# Patient Record
Sex: FEMALE | Race: WHITE | Employment: OTHER | URBAN - METROPOLITAN AREA
[De-identification: names, ages, dates, MRNs, and addresses within clinical notes are randomized per-mention and may not be internally consistent; named-entity substitution may affect disease eponyms.]

---

## 2017-07-19 ENCOUNTER — OFFICE VISIT (OUTPATIENT)
Dept: FAMILY MEDICINE CLINIC | Facility: CLINIC | Age: 22
End: 2017-07-19

## 2017-07-19 VITALS
RESPIRATION RATE: 16 BRPM | SYSTOLIC BLOOD PRESSURE: 120 MMHG | DIASTOLIC BLOOD PRESSURE: 74 MMHG | HEART RATE: 83 BPM | HEIGHT: 63 IN | WEIGHT: 119.38 LBS | BODY MASS INDEX: 21.15 KG/M2

## 2017-07-19 DIAGNOSIS — M54.50 LOW BACK PAIN POTENTIALLY ASSOCIATED WITH RADICULOPATHY: Primary | ICD-10-CM

## 2017-07-19 PROCEDURE — 99203 OFFICE O/P NEW LOW 30 MIN: CPT | Performed by: FAMILY MEDICINE

## 2017-07-19 NOTE — PATIENT INSTRUCTIONS
Back Care Tips    Caring for your back  These are things you can do to prevent a recurrence of acute back pain and to reduce symptoms from chronic back pain:  · Maintain a healthy weight.  If you are overweight, losing weight will help most types of back · Be careful if you are given prescription pain medicines, narcotics, or medicine for muscle spasm. They can cause drowsiness, affect your coordination, reflexes, and judgment. Do not drive or operate heavy machinery while taking these types of medicines. The best way to sleep is on your side with your knees bent. Put a low pillow under your head to support your neck in a neutral spine position. Avoid thick pillows that bend your neck to one side.  Put a pillow between your legs to further relax your lower b

## 2017-07-19 NOTE — PROGRESS NOTES
Alfa Goetz is a 20year old female. HPI:     LBP:  Location  Lower back in the middle, muscles may be sore sometimes as well. Quality  Dull pain, rarely stabbing  Severity  Up to 7/10 if standing up all day at the end of the day.  Seems to hurt all nausea, vomiting, diarrhea and constipation. Genitourinary: Negative. Musculoskeletal:        As in HPI   Skin: Negative for rash. Neurological: Negative for dizziness, weakness, light-headedness, numbness and headaches.    Psychiatric/Behavioral: Ne spine.    - MRI SPINE LUMBAR (CPT=72148); Future          Imaging & Consults:  MRI SPINE LUMBAR (CPT=72148)    Return pending test results.

## 2017-07-20 ENCOUNTER — TELEPHONE (OUTPATIENT)
Dept: FAMILY MEDICINE CLINIC | Facility: CLINIC | Age: 22
End: 2017-07-20

## 2017-07-20 NOTE — TELEPHONE ENCOUNTER
Medical records release, signed by patient, successfully faxed to Dr. Margarito Patterson office requesting most recent x-ray reports and [facility] requesting most recent lab reports.

## 2017-07-21 NOTE — TELEPHONE ENCOUNTER
Lab reports received from [facility] labs placed in the folder to be signed.

## 2017-07-24 ENCOUNTER — TELEPHONE (OUTPATIENT)
Dept: FAMILY MEDICINE CLINIC | Facility: CLINIC | Age: 22
End: 2017-07-24

## 2017-07-24 NOTE — TELEPHONE ENCOUNTER
Good morning,       Rcvd denial forms for CPT code 09067, per fax rcvd request did not meet medical necessity.  Physician can call [phone] w/i 60 days from 7/23/17to request an appeal. Please advise if an appeal is completed and is approval is obtained

## 2017-07-25 NOTE — TELEPHONE ENCOUNTER
Spoke to patient and gave her the contact information to [place] [facility] site [phone] and pt verbalized understanding

## 2017-08-22 ENCOUNTER — OFFICE VISIT (OUTPATIENT)
Dept: SURGERY | Facility: CLINIC | Age: 22
End: 2017-08-22

## 2017-08-22 ENCOUNTER — TELEPHONE (OUTPATIENT)
Dept: SURGERY | Facility: CLINIC | Age: 22
End: 2017-08-22

## 2017-08-22 VITALS
HEART RATE: 66 BPM | RESPIRATION RATE: 16 BRPM | SYSTOLIC BLOOD PRESSURE: 116 MMHG | OXYGEN SATURATION: 99 % | BODY MASS INDEX: 21.26 KG/M2 | HEIGHT: 63 IN | DIASTOLIC BLOOD PRESSURE: 64 MMHG | WEIGHT: 120 LBS

## 2017-08-22 DIAGNOSIS — M54.6 CHRONIC BILATERAL THORACIC BACK PAIN: ICD-10-CM

## 2017-08-22 DIAGNOSIS — G89.29 CHRONIC BILATERAL THORACIC BACK PAIN: ICD-10-CM

## 2017-08-22 DIAGNOSIS — M54.16 LUMBAR RADICULOPATHY: ICD-10-CM

## 2017-08-22 DIAGNOSIS — M47.816 LUMBAR FACET ARTHROPATHY: Primary | ICD-10-CM

## 2017-08-22 DIAGNOSIS — M46.1 BILATERAL SACROILIITIS (HCC): ICD-10-CM

## 2017-08-22 DIAGNOSIS — M47.812 ARTHROPATHY OF CERVICAL FACET JOINT: ICD-10-CM

## 2017-08-22 PROCEDURE — 99204 OFFICE O/P NEW MOD 45 MIN: CPT | Performed by: NURSE PRACTITIONER

## 2017-08-22 RX ORDER — NAPROXEN 500 MG/1
500 TABLET ORAL EVERY 12 HOURS
Qty: 60 TABLET | Refills: 0 | Status: SHIPPED | OUTPATIENT
Start: 2017-08-22 | End: 2017-09-21

## 2017-08-22 RX ORDER — METHOCARBAMOL 500 MG/1
500 TABLET, FILM COATED ORAL EVERY 8 HOURS PRN
Qty: 60 TABLET | Refills: 0 | Status: SHIPPED | OUTPATIENT
Start: 2017-08-22 | End: 2017-09-21
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
## 2017-08-22 NOTE — PATIENT INSTRUCTIONS
Refill policies:    • Allow 2-3 business days for refills; controlled substances may take longer.   • Contact your pharmacy at least 5 days prior to running out of medication and have them send an electronic request or submit request through the [facility] have a procedure or additional testing performed. [facility]) will contact your insurance carrier to obtain pre-certification or prior authorization.     Unfortunately, [facility] has seen an increase in denial of payment even though the p medications:  • Aggrenox 10 days   • Agrylin (Anagrelide) 10 days   • Enbrel (Etanercept) 24 hours   • Fragmin (Dalteparin) 24 hours   • Pletal (Cilostazol) 7 days  • Pradaxa 10 days  • Trental 7 days  • Eliquis (Apixaban) 3 days  • Xarelto (Rivaroxaban) 3 require some medication adjustment. It is normal to have increased pain at injection site for up to 3-5 days after procedure, you can use heat for the first 24 hours (20 minutes on 20 minutes off) after the first 24 hours you can use heat or cold.        Heather Rangel out\" for the facet joint injection? No, this procedure is done with a local anesthetic. Some patients choose to receive intravenous sedation that can make the procedure easier to tolerate.  This type of sedation may cause amnesia and patients may not jacky This is because the effect of the medication injected frequently lasts for six months or more. If three injections have not helped you very much, you may be a candidate for a different procedure.  It is important to keep follow up appointment to clearly com actually injected? The injection consists of a mixture of local anesthetic and a steroid medication. Will the sacroiliac injection hurt? The sacroiliac injection involves inserting a needle through skin and deeper tissues.  The skin and deeper tissues the affected area. You should perform only those activities you can reasonably tolerate. Can I go to work to work the next day? You should be able to return to your work the next day.  The most common symptom you may feel is soreness at the injection sit appetite   • Increased heart rate   • Abdominal cramping or bloating; You should contact our office immediately if you experience any side effects These effects usually resolve within a few days.       NO SHOW POLICY FOR PAIN PROCEDURES  Procedure Cancell diagnosis, you may be given a shot of numbing medicine in the SI joint. Treatment includes rest, physical therapy, and anti-inflammatory medicines. If another health problem is the cause, then that must also be treated.  More testing may be needed if your s you relax. You will lie on an exam table on your stomach. During your treatment:  · The skin over the injection site is cleaned. A pain medicine (local anesthetic) numbs the skin.   · X-ray imaging (fluoroscopy) may be used to help the doctor see your spine include ankylosing spondylitis, rheumatoid arthritis, psoriasis, and Crohn’s disease or colitis. Symptoms may include pain or stiffness in the hips, lower back, thighs, or buttocks.  Pain occurs most often in the morning or after sitting for long periods of eyes  · Skin rash or redness  · Weakness or numbness in one or both legs  · Loss of bowel or bladder control  · Numbness in the groin area  Date Last Reviewed: 11/24/2015  © 0025-5007 The [address] [place] [address], [address] [place], [place] [address].  Al

## 2017-08-22 NOTE — PROGRESS NOTES
Spoke with patient and scheduled injections. Reviewed pre-op instructions. Patient verbalized understanding, no further questions at this time.  Pre-op instructions sent via [employer] and [employer].

## 2017-08-22 NOTE — PROGRESS NOTES
HPI:    Patient ID: Stef Aguilera is a 20year old female. HPI    Review of Systems         No current outpatient prescriptions on file.   Allergies:No Known Allergies   PHYSICAL EXAM:   Physical Exam   Constitutional:                  ASSESSMENT/PLAN

## 2017-08-29 ENCOUNTER — TELEPHONE (OUTPATIENT)
Dept: SURGERY | Facility: CLINIC | Age: 22
End: 2017-08-29

## 2017-08-29 DIAGNOSIS — M47.816 LUMBAR FACET ARTHROPATHY: Primary | ICD-10-CM

## 2017-08-29 NOTE — TELEPHONE ENCOUNTER
Previous order did not specify laterality, please use this case request to replace the previous one that had no laterality.    Thanks

## 2017-09-01 ENCOUNTER — OFFICE VISIT (OUTPATIENT)
Dept: PHYSICAL THERAPY | Age: 22
End: 2017-09-01
Attending: NURSE PRACTITIONER
Payer: MEDICAID

## 2017-09-01 DIAGNOSIS — M54.6 CHRONIC BILATERAL THORACIC BACK PAIN: ICD-10-CM

## 2017-09-01 DIAGNOSIS — M46.1 BILATERAL SACROILIITIS (HCC): ICD-10-CM

## 2017-09-01 DIAGNOSIS — G89.29 CHRONIC BILATERAL THORACIC BACK PAIN: ICD-10-CM

## 2017-09-01 DIAGNOSIS — M54.16 LUMBAR RADICULOPATHY: ICD-10-CM

## 2017-09-01 DIAGNOSIS — M47.812 ARTHROPATHY OF CERVICAL FACET JOINT: ICD-10-CM

## 2017-09-01 DIAGNOSIS — M47.816 LUMBAR FACET ARTHROPATHY: ICD-10-CM

## 2017-09-01 PROCEDURE — 97530 THERAPEUTIC ACTIVITIES: CPT

## 2017-09-01 PROCEDURE — 97162 PT EVAL MOD COMPLEX 30 MIN: CPT

## 2017-09-01 NOTE — PROGRESS NOTES
SPINE EVALUATION:   Referring Physician: Dr. 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        Date of Service: 9/1/2017     PATIENT Trudi Cardenas is a 20year old y/o female who presents to therapy today with co from advanced pain science education with progression through graded exposure to decrease the sensitization that she is experiencing with otherwise ordinary tasks that she is accustomed to.   Her cervical and lumbar ROM is normal.  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 PT described graded exposure principles to include performing an activity to (and not beyond) the point of pain.   She is to modify this activity in duration or technique in order to complete care.    X___________________________________________________ Date____________________    Certification From: 2/5/3887  To:11/30/2017

## 2017-09-06 ENCOUNTER — TELEPHONE (OUTPATIENT)
Dept: NEUROLOGY | Facility: CLINIC | Age: 22
End: 2017-09-06

## 2017-09-11 ENCOUNTER — OFFICE VISIT (OUTPATIENT)
Dept: PHYSICAL THERAPY | Age: 22
End: 2017-09-11
Attending: NURSE PRACTITIONER
Payer: MEDICAID

## 2017-09-11 DIAGNOSIS — M46.1 BILATERAL SACROILIITIS (HCC): ICD-10-CM

## 2017-09-11 DIAGNOSIS — G89.29 CHRONIC BILATERAL THORACIC BACK PAIN: ICD-10-CM

## 2017-09-11 DIAGNOSIS — M54.6 CHRONIC BILATERAL THORACIC BACK PAIN: ICD-10-CM

## 2017-09-11 DIAGNOSIS — M54.16 LUMBAR RADICULOPATHY: ICD-10-CM

## 2017-09-11 DIAGNOSIS — M47.812 ARTHROPATHY OF CERVICAL FACET JOINT: ICD-10-CM

## 2017-09-11 DIAGNOSIS — M47.816 LUMBAR FACET ARTHROPATHY: ICD-10-CM

## 2017-09-11 PROCEDURE — 97140 MANUAL THERAPY 1/> REGIONS: CPT

## 2017-09-11 PROCEDURE — 97110 THERAPEUTIC EXERCISES: CPT

## 2017-09-11 NOTE — PROGRESS NOTES
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 including high level exercise and running. 3. Patient will demonstrate understanding of central sensitization and be able to modify and progress her exercise level according to graded exposure principles.       Plan: Assess response and progress as hadley

## 2017-09-15 ENCOUNTER — OFFICE VISIT (OUTPATIENT)
Dept: PHYSICAL THERAPY | Age: 22
End: 2017-09-15
Attending: NURSE PRACTITIONER
Payer: MEDICAID

## 2017-09-15 DIAGNOSIS — G89.29 CHRONIC BILATERAL THORACIC BACK PAIN: ICD-10-CM

## 2017-09-15 DIAGNOSIS — M47.812 ARTHROPATHY OF CERVICAL FACET JOINT: ICD-10-CM

## 2017-09-15 DIAGNOSIS — M46.1 BILATERAL SACROILIITIS (HCC): ICD-10-CM

## 2017-09-15 DIAGNOSIS — M47.816 LUMBAR FACET ARTHROPATHY: ICD-10-CM

## 2017-09-15 DIAGNOSIS — M54.6 CHRONIC BILATERAL THORACIC BACK PAIN: ICD-10-CM

## 2017-09-15 DIAGNOSIS — M54.16 LUMBAR RADICULOPATHY: ICD-10-CM

## 2017-09-15 PROCEDURE — 97140 MANUAL THERAPY 1/> REGIONS: CPT

## 2017-09-15 PROCEDURE — 97110 THERAPEUTIC EXERCISES: CPT

## 2017-09-15 NOTE — PROGRESS NOTES
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 Patient is very non-descript in her experiences with exercises. There is a vagueness to the answers of her questions. Goals:   1. Increase FOTO >11% from INE.     2. Patient will demonstrate a return to activity at

## 2017-09-18 ENCOUNTER — OFFICE VISIT (OUTPATIENT)
Dept: PHYSICAL THERAPY | Age: 22
End: 2017-09-18
Attending: NURSE PRACTITIONER
Payer: MEDICAID

## 2017-09-18 ENCOUNTER — OFFICE VISIT (OUTPATIENT)
Dept: FAMILY MEDICINE CLINIC | Facility: CLINIC | Age: 22
End: 2017-09-18

## 2017-09-18 VITALS
BODY MASS INDEX: 20.02 KG/M2 | TEMPERATURE: 98 F | HEART RATE: 66 BPM | SYSTOLIC BLOOD PRESSURE: 108 MMHG | WEIGHT: 113 LBS | HEIGHT: 63 IN | DIASTOLIC BLOOD PRESSURE: 62 MMHG

## 2017-09-18 DIAGNOSIS — J01.10 ACUTE FRONTAL SINUSITIS, RECURRENCE NOT SPECIFIED: Primary | ICD-10-CM

## 2017-09-18 DIAGNOSIS — M54.16 LUMBAR RADICULOPATHY: ICD-10-CM

## 2017-09-18 DIAGNOSIS — M54.6 CHRONIC BILATERAL THORACIC BACK PAIN: ICD-10-CM

## 2017-09-18 DIAGNOSIS — M47.812 ARTHROPATHY OF CERVICAL FACET JOINT: ICD-10-CM

## 2017-09-18 DIAGNOSIS — M46.1 BILATERAL SACROILIITIS (HCC): ICD-10-CM

## 2017-09-18 DIAGNOSIS — M47.816 LUMBAR FACET ARTHROPATHY: ICD-10-CM

## 2017-09-18 DIAGNOSIS — R59.1 LYMPHADENOPATHY: ICD-10-CM

## 2017-09-18 DIAGNOSIS — G89.29 CHRONIC BILATERAL THORACIC BACK PAIN: ICD-10-CM

## 2017-09-18 PROCEDURE — 99214 OFFICE O/P EST MOD 30 MIN: CPT | Performed by: FAMILY MEDICINE

## 2017-09-18 PROCEDURE — 97110 THERAPEUTIC EXERCISES: CPT

## 2017-09-18 PROCEDURE — 97140 MANUAL THERAPY 1/> REGIONS: CPT

## 2017-09-18 RX ORDER — NABUMETONE 500 MG/1
500 TABLET, FILM COATED ORAL 2 TIMES DAILY PRN
Qty: 60 TABLET | Refills: 0 | Status: SHIPPED | OUTPATIENT
Start: 2017-09-18 | End: 2018-01-29 | Stop reason: ALTCHOICE

## 2017-09-18 RX ORDER — FLUTICASONE PROPIONATE 50 MCG
2 SPRAY, SUSPENSION (ML) NASAL DAILY
Qty: 1 BOTTLE | Refills: 2 | Status: SHIPPED | OUTPATIENT
Start: 2017-09-18 | End: 2017-10-03 | Stop reason: ALTCHOICE

## 2017-09-18 NOTE — PATIENT INSTRUCTIONS
-- start zyrtec daily  -- start flonase nasal spray daily  -- use ear drops to help loosen wax  --ibuprofen 600mg-800mg up to 3x/day with meals as needed for pain (do not use for prolonged period) - use at least 400-600mg 3x/day with meals for next 2-3 d

## 2017-09-18 NOTE — PROGRESS NOTES
CC:  Leny Singh is a 20year old female here for Patient presents with:  Sore Throat: Swelling in lymph nodes   Ear Pain: Left ear pain is worse       HPI:     URI  -mom noted swollen lymph nodes a couple of weeks ago  -2-3 days ago, started to get s Breastfeeding?  No   BMI 20.02 kg/m²     GENERAL: A&O well developed, well nourished,in no apparent distress  HEENT: atraumatic, pupils round and reactive to light, MMM, pharyngeal erythema without exudates or edema, TMs occluded due to cerumen impaction; s

## 2017-09-18 NOTE — PROGRESS NOTES
Dx: Spinal Central Sensitization         Authorized # of Visits:  6         Next MD visit: none scheduled  Fall Risk: standard         Precautions: n/a             Subjective: Feeling better today than last session.       Objective: Treatment progressed per the hips and pelvis Thoracic PA manipulation with cavitation T1T2 to T7T8       Skilled Services: All Exercises Supervised and Progressed by skilled PT. Assessment: Responded fair. Less pain responses with treatment today.   Increased tolerance for

## 2017-09-22 ENCOUNTER — OFFICE VISIT (OUTPATIENT)
Dept: PHYSICAL THERAPY | Age: 22
End: 2017-09-22
Attending: NURSE PRACTITIONER
Payer: MEDICAID

## 2017-09-22 DIAGNOSIS — M46.1 BILATERAL SACROILIITIS (HCC): ICD-10-CM

## 2017-09-22 DIAGNOSIS — M47.812 ARTHROPATHY OF CERVICAL FACET JOINT: ICD-10-CM

## 2017-09-22 DIAGNOSIS — M47.816 LUMBAR FACET ARTHROPATHY: ICD-10-CM

## 2017-09-22 DIAGNOSIS — G89.29 CHRONIC BILATERAL THORACIC BACK PAIN: ICD-10-CM

## 2017-09-22 DIAGNOSIS — M54.6 CHRONIC BILATERAL THORACIC BACK PAIN: ICD-10-CM

## 2017-09-22 DIAGNOSIS — M54.16 LUMBAR RADICULOPATHY: ICD-10-CM

## 2017-09-22 PROCEDURE — 97110 THERAPEUTIC EXERCISES: CPT

## 2017-09-22 PROCEDURE — 97140 MANUAL THERAPY 1/> REGIONS: CPT

## 2017-09-22 NOTE — PROGRESS NOTES
Dx: Spinal Central Sensitization         Authorized # of Visits:  6         Next MD visit: none scheduled  Fall Risk: standard         Precautions: n/a             Subjective: Patient presents with vague symptomatology complaining of random \"tingles\" and hamstring stretch 10 sec x 10 GR hamstring stretch 10 sec x 10 HOLD HOLD      GR hip flexor stretch 30 sec x 3 GR hip flexor stretch 30 sec x 3 HOLD HOLD       SL Clamshells glut med/min bias red x 20 HOLD SL Clamshells glut med/min bias red x 20       Ant

## 2017-09-29 ENCOUNTER — TELEPHONE (OUTPATIENT)
Dept: NEUROLOGY | Facility: CLINIC | Age: 22
End: 2017-09-29

## 2017-09-30 ENCOUNTER — HOSPITAL (OUTPATIENT)
Dept: OTHER | Age: 22
End: 2017-09-30
Attending: EMERGENCY MEDICINE

## 2017-09-30 LAB
ANALYZER ANC (IANC): ABNORMAL
ANION GAP SERPL CALC-SCNC: 10 MMOL/L (ref 10–20)
BASOPHILS # BLD: 0 THOUSAND/MCL (ref 0–0.3)
BASOPHILS NFR BLD: 0 %
BUN SERPL-MCNC: 12 MG/DL (ref 6–20)
BUN/CREAT SERPL: 14 (ref 7–25)
CALCIUM SERPL-MCNC: 8.9 MG/DL (ref 8.4–10.2)
CHLORIDE: 105 MMOL/L (ref 98–107)
CO2 SERPL-SCNC: 25 MMOL/L (ref 21–32)
CREAT SERPL-MCNC: 0.87 MG/DL (ref 0.51–0.95)
DIFFERENTIAL METHOD BLD: ABNORMAL
EOSINOPHIL # BLD: 0.2 THOUSAND/MCL (ref 0.1–0.5)
EOSINOPHIL NFR BLD: 2 %
ERYTHROCYTE [DISTWIDTH] IN BLOOD: 12.5 % (ref 11–15)
GLUCOSE SERPL-MCNC: 97 MG/DL (ref 65–99)
HCG POINT OF CARE (5HGRST): NEGATIVE
HEMATOCRIT: 35.1 % (ref 36–46.5)
HGB BLD-MCNC: 11.6 GM/DL (ref 12–15.5)
LYMPHOCYTES # BLD: 2.6 THOUSAND/MCL (ref 1–4.8)
LYMPHOCYTES NFR BLD: 26 %
MAGNESIUM SERPL-MCNC: 2 MG/DL (ref 1.7–2.4)
MCH RBC QN AUTO: 26.7 PG (ref 26–34)
MCHC RBC AUTO-ENTMCNC: 33 GM/DL (ref 32–36.5)
MCV RBC AUTO: 80.7 FL (ref 78–100)
MONOCYTES # BLD: 0.3 THOUSAND/MCL (ref 0.3–0.9)
MONOCYTES NFR BLD: 3 %
NEUTROPHILS # BLD: 6.7 THOUSAND/MCL (ref 1.8–7.7)
NEUTROPHILS NFR BLD: 69 %
NEUTS SEG NFR BLD: ABNORMAL %
PERCENT NRBC: ABNORMAL
PLATELET # BLD: 290 THOUSAND/MCL (ref 140–450)
POTASSIUM SERPL-SCNC: 3.5 MMOL/L (ref 3.4–5.1)
RBC # BLD: 4.35 MILLION/MCL (ref 4–5.2)
SODIUM SERPL-SCNC: 136 MMOL/L (ref 135–145)
WBC # BLD: 9.8 THOUSAND/MCL (ref 4.2–11)

## 2017-10-02 ENCOUNTER — TELEPHONE (OUTPATIENT)
Dept: SURGERY | Facility: CLINIC | Age: 22
End: 2017-10-02

## 2017-10-02 NOTE — TELEPHONE ENCOUNTER
Per pt request, pt cancelled 10/3 & 10/10 injections. Pt states she will see neurologist first for additional feedback. Cases cancelled in OR. No further needs.

## 2017-10-03 ENCOUNTER — OFFICE VISIT (OUTPATIENT)
Dept: FAMILY MEDICINE CLINIC | Facility: CLINIC | Age: 22
End: 2017-10-03

## 2017-10-03 VITALS
DIASTOLIC BLOOD PRESSURE: 60 MMHG | HEIGHT: 63 IN | HEART RATE: 66 BPM | WEIGHT: 116 LBS | SYSTOLIC BLOOD PRESSURE: 110 MMHG | BODY MASS INDEX: 20.55 KG/M2

## 2017-10-03 DIAGNOSIS — M54.50 LOW BACK PAIN POTENTIALLY ASSOCIATED WITH RADICULOPATHY: ICD-10-CM

## 2017-10-03 DIAGNOSIS — R42 LIGHTHEADED: ICD-10-CM

## 2017-10-03 DIAGNOSIS — R20.2 PARESTHESIA OF BILATERAL LEGS: Primary | ICD-10-CM

## 2017-10-03 PROCEDURE — 99215 OFFICE O/P EST HI 40 MIN: CPT | Performed by: FAMILY MEDICINE

## 2017-10-03 RX ORDER — AMITRIPTYLINE HYDROCHLORIDE 10 MG/1
10 TABLET, FILM COATED ORAL NIGHTLY
Qty: 30 TABLET | Refills: 0 | Status: SHIPPED | OUTPATIENT
Start: 2017-10-03 | End: 2017-12-15 | Stop reason: ALTCHOICE

## 2017-10-03 NOTE — PROGRESS NOTES
Dayami Tate is a 20year old female here for Patient presents with:  ER F/U: Patient went to [facility] on 09/30/2017 low back and neck pain       HPI:     Low back pain  -has had history of this off and on for several years  -was being seen by angela Denies  --RESP: Denies  --CV: Denies  --GI: Denies  --: Denies  --MSK: Denies  --NEURO: see hpi  --PSYCH: Denies  --SKIN: Denies  All other systems reviewed are negative    PHYSICAL EXAM:   /60 (BP Location: Right arm, Patient Position: Sitting, Cu spent a total of 40 minutes, half of which was spent counseling/coordinating care regarding paresthesias and back pain

## 2017-10-03 NOTE — PATIENT INSTRUCTIONS
-- -- come in for fasting bloodwork anytime that you are able to (8-10h no food, only water; lab here is open M-F, 8am-12)  -- go to [employer] to schedule an appointment online  -- we will call with results about 5-7 days after bloodwork is complete

## 2017-10-09 ENCOUNTER — TELEPHONE (OUTPATIENT)
Dept: NEUROLOGY | Facility: CLINIC | Age: 22
End: 2017-10-09

## 2017-10-10 ENCOUNTER — LAB ENCOUNTER (OUTPATIENT)
Dept: LAB | Age: 22
End: 2017-10-10
Attending: FAMILY MEDICINE
Payer: MEDICAID

## 2017-10-10 DIAGNOSIS — R20.2 PARESTHESIA OF BILATERAL LEGS: ICD-10-CM

## 2017-10-10 DIAGNOSIS — R42 LIGHTHEADED: ICD-10-CM

## 2017-10-10 PROCEDURE — 84550 ASSAY OF BLOOD/URIC ACID: CPT | Performed by: FAMILY MEDICINE

## 2017-10-10 PROCEDURE — 82607 VITAMIN B-12: CPT | Performed by: FAMILY MEDICINE

## 2017-10-10 PROCEDURE — 86200 CCP ANTIBODY: CPT | Performed by: FAMILY MEDICINE

## 2017-10-10 PROCEDURE — 80050 GENERAL HEALTH PANEL: CPT | Performed by: FAMILY MEDICINE

## 2017-10-10 PROCEDURE — 82306 VITAMIN D 25 HYDROXY: CPT | Performed by: FAMILY MEDICINE

## 2017-10-10 PROCEDURE — 86038 ANTINUCLEAR ANTIBODIES: CPT | Performed by: FAMILY MEDICINE

## 2017-10-10 PROCEDURE — 86431 RHEUMATOID FACTOR QUANT: CPT | Performed by: FAMILY MEDICINE

## 2017-10-10 PROCEDURE — 82746 ASSAY OF FOLIC ACID SERUM: CPT | Performed by: FAMILY MEDICINE

## 2017-10-10 PROCEDURE — 36415 COLL VENOUS BLD VENIPUNCTURE: CPT | Performed by: FAMILY MEDICINE

## 2017-10-10 PROCEDURE — 85652 RBC SED RATE AUTOMATED: CPT | Performed by: FAMILY MEDICINE

## 2017-10-10 PROCEDURE — 86140 C-REACTIVE PROTEIN: CPT | Performed by: FAMILY MEDICINE

## 2017-10-13 ENCOUNTER — TELEPHONE (OUTPATIENT)
Dept: SURGERY | Facility: CLINIC | Age: 22
End: 2017-10-13

## 2017-10-13 ENCOUNTER — TELEPHONE (OUTPATIENT)
Dept: FAMILY MEDICINE CLINIC | Facility: CLINIC | Age: 22
End: 2017-10-13

## 2017-10-13 DIAGNOSIS — E55.9 VITAMIN D DEFICIENCY: Primary | ICD-10-CM

## 2017-10-13 RX ORDER — ERGOCALCIFEROL 1.25 MG/1
50000 CAPSULE ORAL WEEKLY
Qty: 12 CAPSULE | Refills: 0 | Status: SHIPPED | OUTPATIENT
Start: 2017-10-13 | End: 2017-12-15 | Stop reason: ALTCHOICE

## 2017-10-13 NOTE — TELEPHONE ENCOUNTER
----- Message from Lynne Pan MD sent at 10/13/2017  9:25 AM CDT -----  Inflammatory labs negative  Vit D low - Vit D 50,000 units weekly x 12 wks, then recheck Vit D upon completion  Mild anemia - try to eat more iron in diet, or consider otc iron on

## 2017-10-13 NOTE — TELEPHONE ENCOUNTER
I left a detailed message on the patient's confidential voice mail stating that per Dr. David Madrigal, her inflammatory labs are negative. Per Dr. David Madrigal, I also stated that her vitamin D is slow and she should start Vitamin D 50,000 units weekly for 12 weeks.  I st

## 2017-10-13 NOTE — TELEPHONE ENCOUNTER
----- Message from Ann Jaime MD sent at 10/13/2017  9:25 AM CDT -----  Inflammatory labs negative  Vit D low - Vit D 50,000 units weekly x 12 wks, then recheck Vit D upon completion  Mild anemia - try to eat more iron in diet, or consider otc iron on

## 2017-10-13 NOTE — TELEPHONE ENCOUNTER
Pt requested her procedure on 10/17 to be cancelled. Case removed from OR. Pt instructed to call and reschedule when she is ready.

## 2017-10-13 NOTE — PROGRESS NOTES
Inflammatory labs negative  Vit D low - Vit D 50,000 units weekly x 12 wks, then recheck Vit D upon completion  Mild anemia - try to eat more iron in diet, or consider otc iron once daily  Followup as planned

## 2017-10-24 ENCOUNTER — OFFICE VISIT (OUTPATIENT)
Dept: FAMILY MEDICINE CLINIC | Facility: CLINIC | Age: 22
End: 2017-10-24

## 2017-10-24 VITALS
SYSTOLIC BLOOD PRESSURE: 112 MMHG | BODY MASS INDEX: 21.09 KG/M2 | HEART RATE: 60 BPM | WEIGHT: 119 LBS | DIASTOLIC BLOOD PRESSURE: 64 MMHG | HEIGHT: 63 IN

## 2017-10-24 DIAGNOSIS — R20.2 PARESTHESIA OF BILATERAL LEGS: ICD-10-CM

## 2017-10-24 DIAGNOSIS — M54.50 LOW BACK PAIN POTENTIALLY ASSOCIATED WITH RADICULOPATHY: Primary | ICD-10-CM

## 2017-10-24 PROCEDURE — 99214 OFFICE O/P EST MOD 30 MIN: CPT | Performed by: FAMILY MEDICINE

## 2017-10-24 NOTE — PATIENT INSTRUCTIONS
--decrease amitriptyline to 1/2 tab daily for 1 wk to see if that helps fatigue in AM - then increase back to a full tab nightly  --consider chiropracter  -try [facility] on [address] ([address]) in [place] - [phone]

## 2017-10-24 NOTE — PROGRESS NOTES
Chi Cole is a 20year old female here for Patient presents with: Follow - Up: The patient states that her back pain has not improved.        HPI:     Low back pain/paresthesias  -comes and goes  -today, pain is a little worse - but also has been wo LMP 10/12/2017 (Exact Date)   Breastfeeding?  No   BMI 21.08 kg/m²     Gen: NAD, alert and oriented x 3  HEENT: NCAT, pupils equal and round  Pulm: CTAB, no wheezing  CV: RRR, no murmurs  Ext: full ROM  Psych: normal affect  Neuro: b/l LE with 5/5 strength,

## 2017-11-08 ENCOUNTER — OFFICE VISIT (OUTPATIENT)
Dept: NEUROLOGY | Facility: CLINIC | Age: 22
End: 2017-11-08

## 2017-11-08 VITALS
DIASTOLIC BLOOD PRESSURE: 68 MMHG | SYSTOLIC BLOOD PRESSURE: 90 MMHG | HEART RATE: 80 BPM | BODY MASS INDEX: 21 KG/M2 | WEIGHT: 119 LBS | RESPIRATION RATE: 16 BRPM

## 2017-11-08 DIAGNOSIS — M47.816 LUMBAR FACET ARTHROPATHY: ICD-10-CM

## 2017-11-08 DIAGNOSIS — R20.2 PARESTHESIA OF BOTH LOWER EXTREMITIES: Primary | ICD-10-CM

## 2017-11-08 PROCEDURE — 99244 OFF/OP CNSLTJ NEW/EST MOD 40: CPT | Performed by: OTHER

## 2017-11-08 NOTE — PATIENT INSTRUCTIONS
After your visit at the [facility] office  today,  please direct any follow up questions or medication needs to the staff in our  [place] office so that your concerns may be promptly addressed.   We are available through [employer] or at the numbers below: office has notified you that the test has been approved by your insurer. Depending on your insurance carrier, approval may take 3-10 days. It is highly recommended patients contact their insurance carrier directly to determine coverage.   If test is done w you are on any medications to thin your blood.

## 2017-11-08 NOTE — PROGRESS NOTES
[facility] [address]   Neurology- INITIAL CLINIC VISIT  2017, 8:87 PM     Willa Ferrari Patient Status:  No patient class for patient encounter    1995 MRN [id number]   [facility] x10(3) uL 2.82   Monocytes Absolute      0.10 - 0.60 x10(3) uL 0.45   Eosinophils Absolute      0.00 - 0.30 x10(3) uL 0.19   Basophils Absolute      0.00 - 0.10 x10(3) uL 0.04   Immature Granulocyte Absolute      0.00 - 1.00 x10(3) uL 0.02   Neutrophils % lesions. Musculoskeletal: There is no scoliosis, or joint deformities  Neurologic examination:  Mental status: Patient is alert, attentive, and oriented x 3. Language is coherent and fluent without aphasia.  Memory, comprehension and ability to follow comm reviewed for nutritional and metabolic causes. Would not check additional blood work at this time, unless above testing is negative.     No prescriptions requested or ordered in this encounter       We discussed in depth regarding the diagnosis, prognosis,

## 2017-11-13 ENCOUNTER — TELEPHONE (OUTPATIENT)
Dept: SURGERY | Facility: CLINIC | Age: 22
End: 2017-11-13

## 2017-11-13 DIAGNOSIS — R20.2 PARESTHESIA OF BOTH LOWER EXTREMITIES: ICD-10-CM

## 2017-11-13 DIAGNOSIS — M47.816 LUMBAR FACET ARTHROPATHY: Primary | ICD-10-CM

## 2017-11-13 NOTE — TELEPHONE ENCOUNTER
In order to complete a medical necessity review on you request, the following information is required: you have requested a MRI Thoracic spine without contrast 67722. The information provided meets clinical guidelines for a MRI Thoracic spine with and without contrast 79811. Please discuss with provider as if the code can be changed so that the medical necessity review process can be completed.

## 2017-11-14 NOTE — TELEPHONE ENCOUNTER
Please place new order for MRI Spine Thoracic w/wo contrast cpt code 36610.  Per Domo REHMAN was approved

## 2017-11-14 NOTE — TELEPHONE ENCOUNTER
Per Cee from [place] team, both MRIs should be ordered w+w/o contrast in order to be approved. New orders placed as written orders and orders for w/o only discontinued.

## 2017-11-20 ENCOUNTER — HOSPITAL ENCOUNTER (OUTPATIENT)
Dept: MRI IMAGING | Age: 22
Discharge: HOME | End: 2017-11-20
Attending: Other
Payer: MEDICAID

## 2017-11-20 DIAGNOSIS — M47.816 LUMBAR FACET ARTHROPATHY: ICD-10-CM

## 2017-11-20 DIAGNOSIS — R20.2 PARESTHESIA OF BOTH LOWER EXTREMITIES: ICD-10-CM

## 2017-11-20 PROCEDURE — 70553 MRI BRAIN STEM W/O & W/DYE: CPT | Performed by: OTHER

## 2017-11-20 PROCEDURE — 72157 MRI CHEST SPINE W/O & W/DYE: CPT | Performed by: OTHER

## 2017-11-20 PROCEDURE — A9575 INJ GADOTERATE MEGLUMI 0.1ML: HCPCS | Performed by: OTHER

## 2017-12-15 ENCOUNTER — OFFICE VISIT (OUTPATIENT)
Dept: FAMILY MEDICINE CLINIC | Facility: CLINIC | Age: 22
End: 2017-12-15

## 2017-12-15 VITALS
HEART RATE: 80 BPM | BODY MASS INDEX: 21.26 KG/M2 | SYSTOLIC BLOOD PRESSURE: 112 MMHG | HEIGHT: 63 IN | DIASTOLIC BLOOD PRESSURE: 60 MMHG | WEIGHT: 120 LBS

## 2017-12-15 DIAGNOSIS — M54.50 LOW BACK PAIN POTENTIALLY ASSOCIATED WITH RADICULOPATHY: ICD-10-CM

## 2017-12-15 DIAGNOSIS — Z00.00 ROUTINE GENERAL MEDICAL EXAMINATION AT A HEALTH CARE FACILITY: Primary | ICD-10-CM

## 2017-12-15 DIAGNOSIS — B07.9 WART OF FACE: ICD-10-CM

## 2017-12-15 PROCEDURE — 99395 PREV VISIT EST AGE 18-39: CPT | Performed by: FAMILY MEDICINE

## 2017-12-15 PROCEDURE — 99213 OFFICE O/P EST LOW 20 MIN: CPT | Performed by: FAMILY MEDICINE

## 2017-12-15 NOTE — PROGRESS NOTES
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 No past medical history on file. No past surgical history on file.    Social History:    Smoking status: Never Smoker                                                              Smokeless tobac

## 2017-12-15 NOTE — PATIENT INSTRUCTIONS
-- start otc compound W (liquid form if available) to see if helps wart  -- if not, come back and we can try to freeze it   -- followup with neuro as planned for emg

## 2017-12-19 ENCOUNTER — OFFICE VISIT (OUTPATIENT)
Dept: ELECTROPHYSIOLOGY | Facility: HOSPITAL | Age: 22
End: 2017-12-19
Attending: Other
Payer: MEDICAID

## 2017-12-19 ENCOUNTER — NURSE ONLY (OUTPATIENT)
Dept: FAMILY MEDICINE CLINIC | Facility: CLINIC | Age: 22
End: 2017-12-19

## 2017-12-19 DIAGNOSIS — M47.816 LUMBAR FACET ARTHROPATHY: ICD-10-CM

## 2017-12-19 DIAGNOSIS — Z11.1 PPD SCREENING TEST: Primary | ICD-10-CM

## 2017-12-19 DIAGNOSIS — R20.2 PARESTHESIA OF BOTH LOWER EXTREMITIES: ICD-10-CM

## 2017-12-19 PROCEDURE — 86580 TB INTRADERMAL TEST: CPT | Performed by: FAMILY MEDICINE

## 2017-12-19 PROCEDURE — 95886 MUSC TEST DONE W/N TEST COMP: CPT | Performed by: OTHER

## 2017-12-19 PROCEDURE — 95909 NRV CNDJ TST 5-6 STUDIES: CPT | Performed by: OTHER

## 2017-12-19 NOTE — PROCEDURES
Nerve Conduction/Electromyography Report      [facility]   EMG department  [place] [address] [address] [address]  [address] [place] [address]  [phone]      Patient name: Chun Melendez  YOB: 1995  Referring physician: Dr. Cuca Schwartz  Reason for study: Arnaldo Parikh

## 2017-12-22 ENCOUNTER — NURSE ONLY (OUTPATIENT)
Dept: FAMILY MEDICINE CLINIC | Facility: CLINIC | Age: 22
End: 2017-12-22

## 2017-12-22 NOTE — PROGRESS NOTES
Completed paperwork mailed to Nissa Mota [id number]    Copy of paperwork mailed to patient  Copy of paperwork sent to

## 2018-01-29 ENCOUNTER — OFFICE VISIT (OUTPATIENT)
Dept: FAMILY MEDICINE CLINIC | Facility: CLINIC | Age: 23
End: 2018-01-29

## 2018-01-29 VITALS
BODY MASS INDEX: 21.44 KG/M2 | HEART RATE: 66 BPM | SYSTOLIC BLOOD PRESSURE: 112 MMHG | HEIGHT: 63 IN | WEIGHT: 121 LBS | DIASTOLIC BLOOD PRESSURE: 68 MMHG

## 2018-01-29 DIAGNOSIS — M54.50 ACUTE BILATERAL LOW BACK PAIN WITHOUT SCIATICA: Primary | ICD-10-CM

## 2018-01-29 PROCEDURE — 99213 OFFICE O/P EST LOW 20 MIN: CPT | Performed by: FAMILY MEDICINE

## 2018-01-29 NOTE — PROGRESS NOTES
Usman Morales is a 20year old female here for Patient presents with:  Low Back Pain      HPI:     Low Back Pain  -has improved back to normal  -no longer has constant pain  -thinks being home on break, combined with slowly easing back into activity hel

## 2018-04-25 ENCOUNTER — OFFICE VISIT (OUTPATIENT)
Dept: FAMILY MEDICINE CLINIC | Facility: CLINIC | Age: 23
End: 2018-04-25

## 2018-04-25 VITALS
DIASTOLIC BLOOD PRESSURE: 62 MMHG | HEIGHT: 63 IN | HEART RATE: 68 BPM | BODY MASS INDEX: 21.79 KG/M2 | SYSTOLIC BLOOD PRESSURE: 110 MMHG | WEIGHT: 123 LBS

## 2018-04-25 DIAGNOSIS — M54.50 ACUTE BILATERAL LOW BACK PAIN WITHOUT SCIATICA: Primary | ICD-10-CM

## 2018-04-25 PROCEDURE — 99214 OFFICE O/P EST MOD 30 MIN: CPT | Performed by: FAMILY MEDICINE

## 2018-04-25 NOTE — PROGRESS NOTES
Anali Martinez is a 20year old female here for Patient presents with:  Low Back Pain: Examination forr the       HPI:     1.  Acute bilateral low back pain without sciatica  -here for updated note for commission   -needs actual information abou restrictions  -followup here as needed        The patient is asked to return in prn.     Dl Willis MD

## 2018-08-14 ENCOUNTER — CHARTING TRANS (OUTPATIENT)
Dept: OTHER | Age: 23
End: 2018-08-14

## 2018-12-08 VITALS
BODY MASS INDEX: 22.15 KG/M2 | SYSTOLIC BLOOD PRESSURE: 118 MMHG | TEMPERATURE: 97.9 F | HEIGHT: 63 IN | RESPIRATION RATE: 14 BRPM | WEIGHT: 125 LBS | HEART RATE: 72 BPM | DIASTOLIC BLOOD PRESSURE: 76 MMHG

## 2019-02-12 ENCOUNTER — OFFICE VISIT (OUTPATIENT)
Dept: FAMILY MEDICINE CLINIC | Facility: CLINIC | Age: 24
End: 2019-02-12
Payer: MEDICAID

## 2019-02-12 VITALS
BODY MASS INDEX: 23.21 KG/M2 | SYSTOLIC BLOOD PRESSURE: 98 MMHG | HEIGHT: 63 IN | WEIGHT: 131 LBS | DIASTOLIC BLOOD PRESSURE: 69 MMHG | HEART RATE: 72 BPM

## 2019-02-12 DIAGNOSIS — Z11.1 SCREENING FOR TUBERCULOSIS: ICD-10-CM

## 2019-02-12 DIAGNOSIS — Z00.00 ROUTINE GENERAL MEDICAL EXAMINATION AT A HEALTH CARE FACILITY: Primary | ICD-10-CM

## 2019-02-12 PROCEDURE — 99395 PREV VISIT EST AGE 18-39: CPT | Performed by: FAMILY MEDICINE

## 2019-02-12 PROCEDURE — 86580 TB INTRADERMAL TEST: CPT | Performed by: FAMILY MEDICINE

## 2019-02-12 NOTE — PROGRESS NOTES
Jorge Calderon is a 24year old female who is here for Patient presents with:   Well Adult: physical       HPI:     Interested in wellness  Needs PPD  Needs form for teaching    Screening:  Diet: healthy  Exercise: active  Sleep: normal  Depression/Anxi stable  EXT: denies edema     Wt Readings from Last 6 Encounters:  02/12/19 : 131 lb  04/25/18 : 123 lb  01/29/18 : 121 lb  12/15/17 : 120 lb  11/08/17 : 119 lb  10/24/17 : 119 lb        Other                       Comment:Tree nut allergy    History revie Prescriptions      No prescriptions requested or ordered in this encounter       Imaging & Referrals:  None     The patient indicates understanding of these issues and agrees to the plan. The patient is asked to return in prn.   Sadaf Awad MD

## 2019-02-12 NOTE — PATIENT INSTRUCTIONS
-- continue with healthy diet and exercise  -- limit alcohol when possible    -- bring in copy of upcoming lab results    -- followup as needed

## 2019-02-14 ENCOUNTER — NURSE ONLY (OUTPATIENT)
Dept: FAMILY MEDICINE CLINIC | Facility: CLINIC | Age: 24
End: 2019-02-14
Payer: MEDICAID

## 2019-02-14 LAB — INDURATION (): 0 MM (ref 0–11)

## 2019-07-30 ENCOUNTER — OFFICE VISIT (OUTPATIENT)
Dept: FAMILY MEDICINE CLINIC | Facility: CLINIC | Age: 24
End: 2019-07-30
Payer: OTHER GOVERNMENT

## 2019-07-30 VITALS
HEIGHT: 63 IN | DIASTOLIC BLOOD PRESSURE: 60 MMHG | BODY MASS INDEX: 23.74 KG/M2 | SYSTOLIC BLOOD PRESSURE: 100 MMHG | WEIGHT: 134 LBS | TEMPERATURE: 99 F

## 2019-07-30 DIAGNOSIS — Z00.00 ROUTINE GENERAL MEDICAL EXAMINATION AT A HEALTH CARE FACILITY: Primary | ICD-10-CM

## 2019-07-30 PROCEDURE — 99395 PREV VISIT EST AGE 18-39: CPT | Performed by: FAMILY MEDICINE

## 2019-07-30 NOTE — PROGRESS NOTES
Dayami Tate is a 21year old female who is here for Patient presents with:  Complete Form:  For work      HPI:     Getting new job as permanent sub for [employer]  Needs wellness completed again for new job  Has new insurance    Screening:  Diet: denies chest pain, pressure or palpitations  GI: denies abdominal pain, constipation, diarrhea, n/v, BRBPR, melena  : no dysuria, frequency, or hematuria  SKIN: denies any unusual skin lesions or rashes  PSYCH: mood is stable  EXT: denies edema     Wt Re and calcium and vitamin D supplementation. Stress Management: counseled  School form completed and faxed - original given back to patient        Orders This Visit:  No orders of the defined types were placed in this encounter.       Meds This Visit:  R

## 2020-07-08 ENCOUNTER — APPOINTMENT (OUTPATIENT)
Dept: CT IMAGING | Age: 25
End: 2020-07-08

## 2020-07-08 ENCOUNTER — TELEPHONE (OUTPATIENT)
Dept: SCHEDULING | Age: 25
End: 2020-07-08

## 2020-07-08 ENCOUNTER — HOSPITAL ENCOUNTER (EMERGENCY)
Age: 25
Discharge: HOME | End: 2020-07-08
Attending: EMERGENCY MEDICINE

## 2020-07-08 VITALS
BODY MASS INDEX: 24.8 KG/M2 | OXYGEN SATURATION: 98 % | WEIGHT: 140 LBS | HEART RATE: 99 BPM | RESPIRATION RATE: 12 BRPM | SYSTOLIC BLOOD PRESSURE: 127 MMHG | TEMPERATURE: 98.2 F | DIASTOLIC BLOOD PRESSURE: 92 MMHG

## 2020-07-08 DIAGNOSIS — R10.30 LOWER ABDOMINAL PAIN: Primary | ICD-10-CM

## 2020-07-08 LAB
ALBUMIN SERPL-MCNC: 4.3 G/DL (ref 3.6–5.1)
ALBUMIN/GLOB SERPL: 1.1 {RATIO} (ref 1–2.4)
ALP SERPL-CCNC: 78 UNITS/L (ref 45–117)
ALT SERPL-CCNC: 26 UNITS/L
ANION GAP SERPL CALC-SCNC: 10 MMOL/L (ref 10–20)
APPEARANCE UR: CLEAR
AST SERPL-CCNC: 25 UNITS/L
BASOPHILS # BLD: 0 K/MCL (ref 0–0.3)
BASOPHILS NFR BLD: 0 %
BILIRUB SERPL-MCNC: 0.5 MG/DL (ref 0.2–1)
BILIRUB UR QL STRIP: NEGATIVE
BUN SERPL-MCNC: 13 MG/DL (ref 6–20)
BUN/CREAT SERPL: 15 (ref 7–25)
CALCIUM SERPL-MCNC: 9.1 MG/DL (ref 8.4–10.2)
CHLORIDE SERPL-SCNC: 111 MMOL/L (ref 98–107)
CO2 SERPL-SCNC: 21 MMOL/L (ref 21–32)
COLOR UR: ABNORMAL
CREAT SERPL-MCNC: 0.89 MG/DL (ref 0.51–0.95)
DIFFERENTIAL METHOD BLD: NORMAL
EOSINOPHIL # BLD: 0.1 K/MCL (ref 0.1–0.5)
EOSINOPHIL NFR BLD: 1 %
ERYTHROCYTE [DISTWIDTH] IN BLOOD: 12.3 % (ref 11–15)
GLOBULIN SER-MCNC: 3.8 G/DL (ref 2–4)
GLUCOSE SERPL-MCNC: 84 MG/DL (ref 65–99)
GLUCOSE UR STRIP-MCNC: NEGATIVE MG/DL
HCG UR QL: NEGATIVE
HCT VFR BLD CALC: 42 % (ref 36–46.5)
HGB BLD-MCNC: 14.3 G/DL (ref 12–15.5)
HGB UR QL STRIP: NEGATIVE
IMM GRANULOCYTES # BLD AUTO: 0 K/MCL (ref 0–0.2)
IMM GRANULOCYTES NFR BLD: 0 %
KETONES UR STRIP-MCNC: NEGATIVE MG/DL
LEUKOCYTE ESTERASE UR QL STRIP: NEGATIVE
LIPASE SERPL-CCNC: 100 UNITS/L (ref 73–393)
LYMPHOCYTES # BLD: 3 K/MCL (ref 1–4.8)
LYMPHOCYTES NFR BLD: 37 %
MCH RBC QN AUTO: 27.6 PG (ref 26–34)
MCHC RBC AUTO-ENTMCNC: 34 G/DL (ref 32–36.5)
MCV RBC AUTO: 81.1 FL (ref 78–100)
MONOCYTES # BLD: 0.4 K/MCL (ref 0.3–0.9)
MONOCYTES NFR BLD: 5 %
NEUTROPHILS # BLD: 4.7 K/MCL (ref 1.8–7.7)
NEUTROPHILS NFR BLD: 57 %
NITRITE UR QL STRIP: NEGATIVE
NRBC BLD MANUAL-RTO: 0 /100 WBC
PH UR STRIP: 6 UNITS (ref 5–7)
PLATELET # BLD: 279 K/MCL (ref 140–450)
POTASSIUM SERPL-SCNC: 4 MMOL/L (ref 3.4–5.1)
PROT SERPL-MCNC: 8.1 G/DL (ref 6.4–8.2)
PROT UR STRIP-MCNC: NEGATIVE MG/DL
RBC # BLD: 5.18 MIL/MCL (ref 4–5.2)
SODIUM SERPL-SCNC: 138 MMOL/L (ref 135–145)
SP GR UR STRIP: 1 (ref 1–1.03)
SPECIMEN SOURCE: ABNORMAL
UROBILINOGEN UR STRIP-MCNC: 0.2 MG/DL (ref 0–1)
WBC # BLD: 8.2 K/MCL (ref 4.2–11)

## 2020-07-08 PROCEDURE — 36415 COLL VENOUS BLD VENIPUNCTURE: CPT

## 2020-07-08 PROCEDURE — 81003 URINALYSIS AUTO W/O SCOPE: CPT

## 2020-07-08 PROCEDURE — 80053 COMPREHEN METABOLIC PANEL: CPT

## 2020-07-08 PROCEDURE — 74177 CT ABD & PELVIS W/CONTRAST: CPT

## 2020-07-08 PROCEDURE — 10002805 HB CONTRAST AGENT: Performed by: PHYSICIAN ASSISTANT

## 2020-07-08 PROCEDURE — 99284 EMERGENCY DEPT VISIT MOD MDM: CPT

## 2020-07-08 PROCEDURE — 84703 CHORIONIC GONADOTROPIN ASSAY: CPT

## 2020-07-08 PROCEDURE — 83690 ASSAY OF LIPASE: CPT

## 2020-07-08 PROCEDURE — 85025 COMPLETE CBC W/AUTO DIFF WBC: CPT

## 2020-07-08 RX ADMIN — IOHEXOL 100 ML: 350 INJECTION, SOLUTION INTRAVENOUS at 17:45

## 2020-07-08 ASSESSMENT — ENCOUNTER SYMPTOMS
CHILLS: 0
WEAKNESS: 0
RHINORRHEA: 0
ABDOMINAL PAIN: 1
BACK PAIN: 0
DIZZINESS: 0
EYE PAIN: 0
CHEST TIGHTNESS: 0
SEIZURES: 0
FATIGUE: 0
NERVOUS/ANXIOUS: 0
VOICE CHANGE: 0
NAUSEA: 1
FEVER: 0
NAUSEA: 0
SHORTNESS OF BREATH: 0
SORE THROAT: 0
VOMITING: 0
LIGHT-HEADEDNESS: 0

## 2020-07-09 ENCOUNTER — VIRTUAL PHONE E/M (OUTPATIENT)
Dept: FAMILY MEDICINE CLINIC | Facility: CLINIC | Age: 25
End: 2020-07-09
Payer: OTHER GOVERNMENT

## 2020-07-09 DIAGNOSIS — R10.31 RLQ ABDOMINAL PAIN: Primary | ICD-10-CM

## 2020-07-09 DIAGNOSIS — R19.7 DIARRHEA, UNSPECIFIED TYPE: ICD-10-CM

## 2020-07-09 PROCEDURE — 99443 PHONE E/M BY PHYS 21-30 MIN: CPT | Performed by: FAMILY MEDICINE

## 2020-07-09 RX ORDER — FAMOTIDINE 20 MG/1
20 TABLET ORAL NIGHTLY
Qty: 30 TABLET | Refills: 1 | Status: SHIPPED | OUTPATIENT
Start: 2020-07-09

## 2020-07-09 RX ORDER — ONDANSETRON 4 MG/1
4 TABLET, FILM COATED ORAL EVERY 8 HOURS PRN
Qty: 20 TABLET | Refills: 0 | Status: SHIPPED | OUTPATIENT
Start: 2020-07-09

## 2020-07-09 NOTE — PROGRESS NOTES
Virtual/Telephone Check-In    Star Jones is a 31year old female here today for a telemedicine audio only visit. Patient understands and accepts financial responsibility for any deductible, co-insurance and/or co-pays associated with this service. Use      Smoking status: Never Smoker      Smokeless tobacco: Never Used    Alcohol use: Yes      Frequency: Monthly or less      Drinks per session: 1 or 2      Comment: social, special occasions    Drug use: No         Medications (Active prior to today'

## 2020-07-15 ENCOUNTER — TELEPHONE (OUTPATIENT)
Dept: FAMILY MEDICINE CLINIC | Facility: CLINIC | Age: 25
End: 2020-07-15

## 2020-07-15 DIAGNOSIS — R10.31 RLQ ABDOMINAL PAIN: Primary | ICD-10-CM

## 2020-07-15 NOTE — TELEPHONE ENCOUNTER
Pt states she discussed getting a pelvic ultrasound with Dr. Ignacio Castillo in one of her visits. She wants to know if that can be ordered.

## 2020-07-30 ENCOUNTER — HOSPITAL ENCOUNTER (OUTPATIENT)
Dept: ULTRASOUND IMAGING | Age: 25
Discharge: HOME | End: 2020-07-30
Attending: FAMILY MEDICINE
Payer: OTHER GOVERNMENT

## 2020-07-30 DIAGNOSIS — R10.31 RLQ ABDOMINAL PAIN: ICD-10-CM

## 2020-07-30 PROCEDURE — 76856 US EXAM PELVIC COMPLETE: CPT | Performed by: FAMILY MEDICINE

## 2020-07-30 PROCEDURE — 76830 TRANSVAGINAL US NON-OB: CPT | Performed by: FAMILY MEDICINE

## 2020-09-21 ENCOUNTER — TELEPHONE (OUTPATIENT)
Dept: OBGYN CLINIC | Facility: CLINIC | Age: 25
End: 2020-09-21

## (undated) NOTE — LETTER
Date: 1/29/2018    Patient Name: Adam Stack          To Whom it may concern:      [place] has been followed for back pain over the past 3 months. It has now resolved completely.   She is cleared to return to full duty without restrictions as of 1/29/

## (undated) NOTE — LETTER
Patient Name: Carmen Bliss  YOB: 1995          MRN number:  [id number]  Date:  9/1/2017  Referring Physician:  Ms. Rocio Aaron and Dr. Samantha Lamas DO       SPINE EVALUATION:    Referring Physician: Ms. Rocio Aaron   Diagnosis: Reina Cisse The patient presents to outpatient Physical Therapy with an increase in sensitivity of the central nervous system following an aggressive competitive event approximately 9 months ago.   The patient would benefit from advanced pain science education with pro Patient education provided on 9/1/2017 regarding the examination findings and plan of care to include graded exposure training and progressive return to exercise program as prior to development of pain.   A letter was provided to the patient to provide her via fax as soon as possible to [phone].  If you have any questions, please contact me at Dept: [phone]    Sincerely,  Electronically signed by therapist: Yrn Colby PT    Physician's certification required: Yes  I certify the need for these serv

## (undated) NOTE — LETTER
Date: 12/15/2017    Patient Name: Ghassan Garcia          To Whom it may concern: This letter has been written at the patient's request. The above patient was seen at the [facility] for treatment of a medical condition.     She was seen o

## (undated) NOTE — LETTER
Date: 4/25/2018    Patient Name: Neelima Hargrove          To Whom it may concern:      Willa has had no back pain for about 5 months now. She continues to maintain an active lifestyle without any pain.   She has been and continues to be cleared to retu